# Patient Record
Sex: FEMALE | Race: WHITE | Employment: UNEMPLOYED | RURAL
[De-identification: names, ages, dates, MRNs, and addresses within clinical notes are randomized per-mention and may not be internally consistent; named-entity substitution may affect disease eponyms.]

---

## 2022-10-15 ENCOUNTER — HOSPITAL ENCOUNTER (EMERGENCY)
Facility: HOSPITAL | Age: 22
Discharge: HOME | End: 2022-10-16
Attending: FAMILY MEDICINE
Payer: MEDICAID

## 2022-10-15 DIAGNOSIS — M54.2 ACUTE NECK PAIN: Primary | ICD-10-CM

## 2022-10-15 DIAGNOSIS — I88.9 CERVICAL ADENITIS: ICD-10-CM

## 2022-10-15 LAB — SARS-COV-2, NAAT: NOT DETECTED

## 2022-10-15 PROCEDURE — 99284 EMERGENCY DEPT VISIT MOD MDM: CPT

## 2022-10-15 PROCEDURE — 93005 ELECTROCARDIOGRAM TRACING: CPT

## 2022-10-15 PROCEDURE — 87635 SARS-COV-2 COVID-19 AMP PRB: CPT

## 2022-10-16 VITALS
TEMPERATURE: 98.6 F | HEART RATE: 53 BPM | SYSTOLIC BLOOD PRESSURE: 115 MMHG | RESPIRATION RATE: 16 BRPM | OXYGEN SATURATION: 99 % | DIASTOLIC BLOOD PRESSURE: 82 MMHG

## 2022-10-16 LAB
A/G RATIO: 2.3 (ref 0.8–2)
ALBUMIN SERPL-MCNC: 5 G/DL (ref 3.4–4.8)
ALP BLD-CCNC: 78 U/L (ref 25–100)
ALT SERPL-CCNC: 7 U/L (ref 4–36)
ANION GAP SERPL CALCULATED.3IONS-SCNC: 12 MMOL/L (ref 3–16)
AST SERPL-CCNC: 10 U/L (ref 8–33)
BASOPHILS ABSOLUTE: 0 K/UL (ref 0–0.1)
BASOPHILS RELATIVE PERCENT: 0.1 %
BILIRUB SERPL-MCNC: 0.5 MG/DL (ref 0.3–1.2)
BUN BLDV-MCNC: 12 MG/DL (ref 6–20)
CALCIUM SERPL-MCNC: 9.4 MG/DL (ref 8.5–10.5)
CHLORIDE BLD-SCNC: 100 MMOL/L (ref 98–107)
CO2: 25 MMOL/L (ref 20–30)
CREAT SERPL-MCNC: 0.7 MG/DL (ref 0.4–1.2)
EOSINOPHILS ABSOLUTE: 0 K/UL (ref 0–0.4)
EOSINOPHILS RELATIVE PERCENT: 0 %
GFR AFRICAN AMERICAN: >59
GFR NON-AFRICAN AMERICAN: >60
GLOBULIN: 2.2 G/DL
GLUCOSE BLD-MCNC: 99 MG/DL (ref 74–106)
HCT VFR BLD CALC: 41.1 % (ref 37–47)
HEMOGLOBIN: 14 G/DL (ref 11.5–16.5)
IMMATURE GRANULOCYTES #: 0 K/UL
IMMATURE GRANULOCYTES %: 0.4 % (ref 0–5)
LYMPHOCYTES ABSOLUTE: 1 K/UL (ref 1.5–4)
LYMPHOCYTES RELATIVE PERCENT: 11.6 %
MCH RBC QN AUTO: 29.7 PG (ref 27–32)
MCHC RBC AUTO-ENTMCNC: 34.1 G/DL (ref 31–35)
MCV RBC AUTO: 87.1 FL (ref 80–100)
MONO TEST: NEGATIVE
MONOCYTES ABSOLUTE: 0.3 K/UL (ref 0.2–0.8)
MONOCYTES RELATIVE PERCENT: 3.4 %
NEUTROPHILS ABSOLUTE: 6.9 K/UL (ref 2–7.5)
NEUTROPHILS RELATIVE PERCENT: 84.5 %
PDW BLD-RTO: 11.6 % (ref 11–16)
PLATELET # BLD: 200 K/UL (ref 150–400)
PMV BLD AUTO: 11 FL (ref 6–10)
POTASSIUM SERPL-SCNC: 3.9 MMOL/L (ref 3.4–5.1)
RAPID INFLUENZA  B AGN: NEGATIVE
RAPID INFLUENZA A AGN: NEGATIVE
RBC # BLD: 4.72 M/UL (ref 3.8–5.8)
S PYO AG THROAT QL: NEGATIVE
SODIUM BLD-SCNC: 137 MMOL/L (ref 136–145)
TOTAL PROTEIN: 7.2 G/DL (ref 6.4–8.3)
WBC # BLD: 8.2 K/UL (ref 4–11)

## 2022-10-16 PROCEDURE — 87804 INFLUENZA ASSAY W/OPTIC: CPT

## 2022-10-16 PROCEDURE — 80053 COMPREHEN METABOLIC PANEL: CPT

## 2022-10-16 PROCEDURE — 93005 ELECTROCARDIOGRAM TRACING: CPT

## 2022-10-16 PROCEDURE — 6360000002 HC RX W HCPCS: Performed by: FAMILY MEDICINE

## 2022-10-16 PROCEDURE — 6370000000 HC RX 637 (ALT 250 FOR IP): Performed by: FAMILY MEDICINE

## 2022-10-16 PROCEDURE — 96374 THER/PROPH/DIAG INJ IV PUSH: CPT

## 2022-10-16 PROCEDURE — 87880 STREP A ASSAY W/OPTIC: CPT

## 2022-10-16 PROCEDURE — 85025 COMPLETE CBC W/AUTO DIFF WBC: CPT

## 2022-10-16 PROCEDURE — 36415 COLL VENOUS BLD VENIPUNCTURE: CPT

## 2022-10-16 PROCEDURE — 86308 HETEROPHILE ANTIBODY SCREEN: CPT

## 2022-10-16 RX ORDER — ACETAMINOPHEN 325 MG/1
650 TABLET ORAL ONCE
Status: COMPLETED | OUTPATIENT
Start: 2022-10-16 | End: 2022-10-16

## 2022-10-16 RX ORDER — ONDANSETRON 2 MG/ML
4 INJECTION INTRAMUSCULAR; INTRAVENOUS ONCE
Status: COMPLETED | OUTPATIENT
Start: 2022-10-16 | End: 2022-10-16

## 2022-10-16 RX ORDER — AZITHROMYCIN 250 MG/1
TABLET, FILM COATED ORAL
Qty: 5 TABLET | Refills: 0 | Status: SHIPPED | OUTPATIENT
Start: 2022-10-16

## 2022-10-16 RX ORDER — METHOCARBAMOL 500 MG/1
500 TABLET, FILM COATED ORAL 4 TIMES DAILY
COMMUNITY

## 2022-10-16 RX ORDER — 0.9 % SODIUM CHLORIDE 0.9 %
1000 INTRAVENOUS SOLUTION INTRAVENOUS ONCE
Status: DISCONTINUED | OUTPATIENT
Start: 2022-10-16 | End: 2022-10-16 | Stop reason: HOSPADM

## 2022-10-16 RX ADMIN — ONDANSETRON 4 MG: 2 INJECTION INTRAMUSCULAR; INTRAVENOUS at 02:14

## 2022-10-16 RX ADMIN — ACETAMINOPHEN 650 MG: 325 TABLET, FILM COATED ORAL at 02:35

## 2022-10-16 RX ADMIN — Medication 1000 ML: at 02:16

## 2022-10-16 ASSESSMENT — PAIN SCALES - GENERAL
PAINLEVEL_OUTOF10: 5
PAINLEVEL_OUTOF10: 10

## 2022-10-16 ASSESSMENT — PAIN DESCRIPTION - LOCATION
LOCATION: THROAT
LOCATION: THROAT

## 2022-10-16 ASSESSMENT — ENCOUNTER SYMPTOMS: SORE THROAT: 1

## 2022-10-16 NOTE — ED TRIAGE NOTES
Pt.d'c'ed amb. and in satis. cond. at [address] [place] [address]  from ER. Amb.at d'c. No c/o at d'c.

## 2022-10-16 NOTE — ED TRIAGE NOTES
Pt arrives to ED with c/o of sore throat, fatigue/weakness, intermittent tachycardia/palpitations, and SOB for about a week. Pt was evaluated at another facility and was administered amoxicillin, muscle relaxants, and prednisone. Pt's symptoms have been worsening.

## 2022-10-16 NOTE — ED NOTES
Pt tolerated IV and medications well. IV is slightly positional. Family is in there with pt. She complaint of pain in legs.       Krysta Joyce RN  10/16/22 [id number]

## 2022-10-16 NOTE — ED PROVIDER NOTES
[address] [address]  eMERGENCY dEPARTMENT eNCOUnter      Pt Name: Amarilis Hidalgo  MRN: [id number]  [place] 2000  Date of evaluation: 10/15/2022  Provider: Kaylyn Jiménez DO    CHIEF COMPLAINT       Chief Complaint   Patient presents with    Pharyngitis    Fatigue    Palpitations         HISTORY OF PRESENT ILLNESS   (Location/Symptom, Timing/Onset, Context/Setting, Quality, Duration, Modifying Factors, Severity)  Note limiting factors. Amarilis Hidalgo is a 23 y.o. female who presents to the emergency department for heart palpitations, feels like she is getting SOA feeling out of breath. Pt also having pain to right side of neck, feeling like there is drainage going down her throat and pain to the front of the neck. On Oct 7th, she started having heart palpitations, EMS called and they checked her out. After that, she started having warm sensation to the left neck and going down her left arm. She went to the hospital [facility] and they said that it could be a nerve and she was treated as panic attack and given Klonopin when she went back 3 days later. They did scan of her neck and did basic lab work and given muscle relaxants and prednisone. She spoke with her doctor on Telehealth talking about the neck issues and drainage and was given Amoxil. She had been tested COVID/strep and was negative. Nursing Notes were reviewed. REVIEW OF SYSTEMS    (2-9 systems for level 4, 10 or more forlevel 5)     Review of Systems   Constitutional:  Positive for fatigue. HENT:  Positive for sore throat. Posterior throat drainage   Musculoskeletal:  Positive for neck pain. Neurological:  Positive for weakness. All other systems reviewed and are negative. PAST MEDICAL HISTORY   No past medical history on file. SURGICAL HISTORY     No past surgical history on file.       CURRENT MEDICATIONS       Previous Medications    METHOCARBAMOL (ROBAXIN) 500 MG TABLET    Take 500 mg by mouth 4 times daily    PREDNISONE PO    Take by mouth       ALLERGIES     Toradol [ketorolac tromethamine]    FAMILY HISTORY     No family history on file. SOCIAL HISTORY       Social History     Socioeconomic History    Marital status:        SCREENINGS    Danni Coma Scale  Eye Opening: Spontaneous  Best Verbal Response: Oriented  Best Motor Response: Obeys commands  Danni Coma Scale Score: 15        PHYSICAL EXAM    (up to 7 for level 4, 8 or more for level 5)     ED Triage Vitals   BP Temp Temp Source Heart Rate Resp SpO2 Height Weight   10/16/22 0000 10/16/22 0007 10/16/22 0007 10/16/22 0007 10/16/22 0007 10/16/22 0002 -- --   132/84 98.6 °F (37 °C) Oral 70 16 99 %         Physical Exam  Vitals and nursing note reviewed. Constitutional:       General: She is not in acute distress. Appearance: She is well-developed and normal weight. HENT:      Head: Normocephalic. Right Ear: Tympanic membrane normal.      Left Ear: Tympanic membrane normal.      Nose: No congestion or rhinorrhea. Mouth/Throat:      Mouth: Mucous membranes are dry. No oral lesions. Pharynx: No pharyngeal swelling, oropharyngeal exudate, posterior oropharyngeal erythema or uvula swelling. Tonsils: No tonsillar exudate or tonsillar abscesses. Eyes:      Conjunctiva/sclera: Conjunctivae normal.      Pupils: Pupils are equal, round, and reactive to light. Neck:      Comments: Tenderness to anterior neck to palpation with upper cervical chain adenopathy  Cardiovascular:      Rate and Rhythm: Normal rate and regular rhythm. Heart sounds: Normal heart sounds. Pulmonary:      Effort: Pulmonary effort is normal.      Breath sounds: Normal breath sounds. No stridor. No wheezing or rhonchi. Musculoskeletal:      Cervical back: Neck supple. Lymphadenopathy:      Cervical: Cervical adenopathy present. Skin:     General: Skin is warm and dry.    Neurological:      Mental Status: She is alert and oriented to person, place, and time. DIAGNOSTIC RESULTS     EKG: All EKG's are interpreted by the Emergency Department Physician who either signs or Co-signs this chart in the absence of a cardiologist.    HR 63; Sinus Rhythm; Normal  ms; Normal QRS 95 ms; No acute ST- T changes; Normal axis    RADIOLOGY:   Non-plain film images such as CT, Ultrasound and MRI are read by the radiologist. Plain radiographic images are visualized andpreliminarily interpreted by the emergency physician with the below findings:    None    Interpretationper the Radiologist below, if available at the time of this note:    No orders to display         ED BEDSIDE ULTRASOUND:   Performed by ED Physician - none    LABS:  Labs Reviewed   CBC WITH AUTO DIFFERENTIAL - Abnormal; Notable for the following components:       Result Value    MPV 11.0 (*)     Lymphocytes Absolute 1.0 (*)     All other components within normal limits   COMPREHENSIVE METABOLIC PANEL - Abnormal; Notable for the following components:    Albumin 5.0 (*)     Albumin/Globulin Ratio 2.3 (*)     All other components within normal limits   COVID-19, RAPID   STREP SCREEN GROUP A THROAT   RAPID INFLUENZA A/B ANTIGENS   MONONUCLEOSIS SCREEN   REECE BARR VIRUS (EBV) ANTIBODY PANEL I       All other labs were within normal range or not returned as of this dictation. EMERGENCY DEPARTMENT COURSE and DIFFERENTIAL DIAGNOSIS/MDM:   :    Vitals:    10/16/22 0119 10/16/22 0129 10/16/22 0139 10/16/22 0149   BP: (!) 153/85 (!) 123/91  123/88   Pulse: 63 64 69 65   Resp: 17 20 23 14   Temp:       TempSrc:       SpO2: 98% 97% 98% 98%           CRITICAL CARE TIME   Total Critical Care time was 0 minutes, excluding separatelyreportable procedures. There was a high probability ofclinically significant/life threatening deterioration in the patient's condition which required my urgent intervention.      CONSULTS:  None    PROCEDURES:  None    PROGRESS NOTES:    Pt with anterior neck pain, post nasal drip, she has been seen 3-4 times in ED and tele clinic. Pt is still having anterior neck pain. She had CT scan of neck and labs. She is on Amoxil and still having symptoms since 10/7. Could be tracheitis and anterior cervical adenopathy. Though she had thyroid test done, she could have subacute thyroiditis although thyroid not focally tender or edematous. Unlikely muscle strain. Will get mono test with fatigue, sore throat, and neck pain. No large adenopathy. Will give fluids and check labs; Nausea meds given. Labs and Mono negative. Will change amoxil to Zmax and have patient follow up if symptoms persist over next 1 week    Is this patient to be included in the SEP-1 Core Measure due to severe sepsis or septic shock? No   Exclusion criteria - the patient is NOT to be included for SEP-1 Core Measure due to:  2+ SIRS criteria are not met     FINAL IMPRESSION      1. Acute neck pain New Problem   2. Cervical adenitis New Problem         DISPOSITION/PLAN   DISPOSITION Decision To Discharge 10/16/2022 03:12:21 AM      PATIENT REFERRED TO:    Stop Amoxil and start antibiotic tomorrow.  Pt to monitor symptoms and see primary care if symptoms continue over next 1 week        DISCHARGE MEDICATIONS:  New Prescriptions    AZITHROMYCIN (ZITHROMAX) 250 MG TABLET    Take 2 tabs on day 1, then 1 tab daily for next 4 days       (Please note that portions of this note were completed with a voice recognition program.  Efforts were made to edit the dictations but occasionallywords are mis-transcribed.)    Jacki Nicolas DO (electronically signed)  Attending Emergency Physician          Jacki Nicolas DO  10/16/22 DO Sondra  10/17/22 0429

## 2022-10-17 ASSESSMENT — ENCOUNTER SYMPTOMS: SORE THROAT: 1
